# Patient Record
Sex: MALE | Race: WHITE | NOT HISPANIC OR LATINO | Employment: FULL TIME | URBAN - METROPOLITAN AREA
[De-identification: names, ages, dates, MRNs, and addresses within clinical notes are randomized per-mention and may not be internally consistent; named-entity substitution may affect disease eponyms.]

---

## 2023-03-07 ENCOUNTER — OFFICE VISIT (OUTPATIENT)
Dept: INTERNAL MEDICINE | Facility: CLINIC | Age: 48
End: 2023-03-07
Payer: COMMERCIAL

## 2023-03-07 VITALS
BODY MASS INDEX: 25.53 KG/M2 | DIASTOLIC BLOOD PRESSURE: 80 MMHG | OXYGEN SATURATION: 97 % | WEIGHT: 205.3 LBS | HEIGHT: 75 IN | HEART RATE: 66 BPM | SYSTOLIC BLOOD PRESSURE: 136 MMHG | TEMPERATURE: 97.9 F

## 2023-03-07 DIAGNOSIS — K40.90 RIGHT INGUINAL HERNIA: ICD-10-CM

## 2023-03-07 DIAGNOSIS — Z00.00 ROUTINE GENERAL MEDICAL EXAMINATION AT A HEALTH CARE FACILITY: Primary | ICD-10-CM

## 2023-03-07 DIAGNOSIS — Z12.5 SCREENING FOR PROSTATE CANCER: ICD-10-CM

## 2023-03-07 DIAGNOSIS — Z13.6 CARDIOVASCULAR SCREENING; LDL GOAL LESS THAN 130: ICD-10-CM

## 2023-03-07 DIAGNOSIS — Z13.220 SCREENING FOR HYPERLIPIDEMIA: ICD-10-CM

## 2023-03-07 DIAGNOSIS — Z01.818 PREOP GENERAL PHYSICAL EXAM: ICD-10-CM

## 2023-03-07 DIAGNOSIS — Z11.4 SCREENING FOR HIV (HUMAN IMMUNODEFICIENCY VIRUS): ICD-10-CM

## 2023-03-07 DIAGNOSIS — Z12.11 SCREEN FOR COLON CANCER: ICD-10-CM

## 2023-03-07 DIAGNOSIS — Z11.59 NEED FOR HEPATITIS C SCREENING TEST: ICD-10-CM

## 2023-03-07 PROCEDURE — 99386 PREV VISIT NEW AGE 40-64: CPT | Performed by: INTERNAL MEDICINE

## 2023-03-07 ASSESSMENT — ENCOUNTER SYMPTOMS
FEVER: 0
DIZZINESS: 0
EYE PAIN: 0
CONSTIPATION: 0
HEMATOCHEZIA: 0
DYSURIA: 0
HEADACHES: 0
DIARRHEA: 0
HEMATURIA: 0
SORE THROAT: 0
HEARTBURN: 0
PARESTHESIAS: 0
NAUSEA: 0
COUGH: 0
CHILLS: 0
ABDOMINAL PAIN: 0
JOINT SWELLING: 0
SHORTNESS OF BREATH: 0
WEAKNESS: 0
MYALGIAS: 0
NERVOUS/ANXIOUS: 0
FREQUENCY: 0
ARTHRALGIAS: 0
PALPITATIONS: 0

## 2023-03-07 ASSESSMENT — PAIN SCALES - GENERAL: PAINLEVEL: NO PAIN (0)

## 2023-03-07 NOTE — PATIENT INSTRUCTIONS
Referral to surgery clinic to evaluate the hernia and discuss repair options.     [facility]   [address]   [facility] [address]   Phone: [phone]   Fax: [phone]           PRE-OPERATIVE INSTRUCTIONS:     *  Contact your surgeon if there is any change in your health. This includes signs of new infection, such as cold or flu (such as a sore throat, runny nose, cough, rash or fever).  Elective surgeries may need to be postponed if there is significant illness present.    *        *      *  Stop aspirin of any kind for 7 days before procedure.   (even low dose daily aspirin).    *  No NSAIDs (Motrin, Advil, ibuprofen, Aleve, etc) within 5-7 days of surgery.    *  Stop any Fish Oil or multi vitamin (and specifically anything containing vitamin E) supplements for 7 days prior to surgery because these can affect platelet function.      *  Tylenol (acetaminophen) is OK to take if needed, this medication has no effect on platelet function.  Follow instructions on the bottle    *  Prepare your body as instructed by the surgery clinic.  If instructed, Take a shower or bath the night before surgery. Use any special soaps or cleaning instructions according to instructions from your surgeon.  If you do not have soap from your surgeon, use your regular soap. Do not shave or scrub the surgery site.  Wear clean pajamas and have clean sheets on your bed     *  ON THE MORNING OF SURGERY:     --take no medications.       *  Resume all medications at the same doses after surgery, unless instructed otherwise by the medical staff.     *  Attend all follow up appointments with the surgeons (and/or therapists if applicable) as instructed.     *  Contact the surgeon's office for any specific questions about after-surgery cares and follow up instructions.      *  You do not need to necessarily return to see us after your surgery unless instructed to do so.        IF YOU REQUIRE NARCOTIC PAIN  MEDICATION AFTER YOUR SURGERY:    --Take the narcotic pain medication exactly as prescribed, and use the absolute lowest dose needed for pain control.   The goal of pain medication is not complete pain relief, aim to just make it manageable.    --Beware of drowsiness, nausea, vomiting, when taking this medication.  Do not drive, or operate dangerous equipment after taking this.    --The main side effects from narcotic pain medication can also include intestinal side effects including nausea, vomiting, constipation, or diarrhea.    --If your pain is not able to be controlled with the pain medication supplied to you, contact the surgeons because uncontrolled pain can be a sign of possible surgical complications.    --In case of constipation from pain medications, take over the counter Miralax powder or stool softner Senokot.  If you have a history of constipation with narcotic pain medication, consider taking Miralax powder on any day that you take a pain tablet.                      ColoGuard Colon cancer screening:     *  The ColoGuard stool test is good noninvasive way to screen for colon cancer, in average risk individuals.      *  You should NOT use ColoGuard for colon cancer screening if you have a family history of colon cancer or if you have a history of pre-cancerous polyps, you should have a colonoscopy if you have either of these situations.      *  The ColoGuard collection kit will be mailed to your home address by the [employer].  Follow the instructions for collecting and returning the tablet.      *  I will inform you about the results.     *  If the ColoGuard test is Negative, then no colon cancer screening needed for 3 years (would consider either repeating the ColoGuard test or else doing a colonoscopy).     *  If the ColoGuard test is Positive, this does NOT mean that you have colon cacner, it simply means that you will need to have a colonoscopy.  Most of the time, pre-cancerous polyps can  "turn this test positive.        5 GOALS TO PREVENT VASCULAR DISEASE:     1.  Aggressive blood pressure control, under 130/80 ideally.  Using medications if needed.    Your blood pressure is under good control    BP Readings from Last 4 Encounters:   03/07/23 136/80   09/04/07 128/78   03/19/07 125/74       2.  Aggressive LDL cholesterol (\"bad cholesterol\") lowering as indicated.    Your goal is an LDL under 130 for sure, preferably under 100.  (If you have diabetes or previous vascular disease, the the LDL goals would be under 100 for sure, preferably under 70.)    New guidelines identify four high-risk groups who could benefit from statins:   *people with pre-existing heart disease, such as those who have had a heart attack;   *people ages 40 to 75 who have diabetes of any type  *patients ages 40 to 75 with at least a 7.5% risk of developing cardiovascular disease over the next decade, according to a formula described in the guidelines  *patients with the sort of super-high cholesterol that sometimes runs in families, as evidenced by an LDL of 190 milligrams per deciliter or higher        3.  Aggressive diabetic prevention, screening and/or management.      We are checking for diabetes.      4.  No smoking    5.  Consider daily preventative aspirin over age 50 if you have enough cardiac risk factors to place you at higher risk for the presence of vascular disease.    If you have any reason not to take aspirin such easy bruising or bleeding, stomach problems, other anticoagulant medications, or any other side effects, then you should not take Aspirin.     --Based on your current cardiac disease risk profile and/or age over 75, you do NOT need to take daily preventative aspirin.            Preventive Health Recommendations  Male Ages 40 to 49    Yearly exam:             See your health care provider every year in order to  o   Review health changes.   o   Discuss preventive care.    o   Review your medicines if your " "doctor has prescribed any.  You should be tested each year for STDs (sexually transmitted diseases) if you re at risk.   Have a cholesterol test every 5 years.   Have a colonoscopy (test for colon cancer) if someone in your family has had colon cancer or polyps before age 50.   After age 45, have a diabetes test (fasting glucose). If you are at risk for diabetes, you should have this test every 3 years.    Talk with your health care provider about whether or not a prostate cancer screening test (PSA) is right for you.    Shots: Get a flu shot each year. Get a tetanus shot every 10 years.     Nutrition:  Eat at least 5 servings of fruits and vegetables daily.   Eat whole-grain bread, whole-wheat pasta and brown rice instead of white grains and rice.   Get adequate Calcium and Vitamin D.      --Good Grains:  Oats, brown rice, Quinoa (these do not raise the blood sugar as much)     --Bad grains:  Anything made from wheat or white rice     (because these raise the blood sugars significantly, and the possible gluten issue from wheat for some people).      --Proteins:  Aim for \"lean proteins\" including chicken, fish, seafood, pork, turkey, and eggs (in moderation); Eat red meat only occasionally      Lifestyle  Exercise for at least 150 minutes a week (30 minutes a day, 5 days a week). This will help you control your weight and prevent disease.   Limit alcohol to one drink per day.   No smoking.   Wear sunscreen to prevent skin cancer.   See your dentist every six months for an exam and cleaning.      "

## 2023-03-07 NOTE — PROGRESS NOTES
"SUBJECTIVE:   CC: Allen is an 47 year old who presents for preventative health visit.     Patient has been advised of split billing requirements and indicates understanding: Yes  Healthy Habits:     Getting at least 3 servings of Calcium per day:  Yes    Bi-annual eye exam:  Yes    Dental care twice a year:  Yes    Sleep apnea or symptoms of sleep apnea:  None    Diet:  Regular (no restrictions)    Frequency of exercise:  6-7 days/week    Duration of exercise:  45-60 minutes    Taking medications regularly:  Not Applicable    Barriers to taking medications:  None    Medication side effects:  Not applicable and None    PHQ-2 Total Score: 0    Additional concerns today:  Yes      Answers for HPI/ROS submitted by the patient on 3/7/2023  Frequency of exercise:: 6-7 days/week  Getting at least 3 servings of Calcium per day:: Yes  Diet:: Regular (no restrictions)  Taking medications regularly:: Not Applicable  Medication side effects:: Not applicable, None  Bi-annual eye exam:: Yes  Dental care twice a year:: Yes  Sleep apnea or symptoms of sleep apnea:: None  Additional concerns today:: Yes  Duration of exercise:: 45-60 minutes          1.  Reports \"bulge\" in the right groin area for past few months.  Appeared suddenly.  Reduces when he poushes back in, then pops out again.   No pain, does not bother him.     Today's PHQ-2 Score:   PHQ-2 ( 1999 [employer]) 3/7/2023   Q1: Little interest or pleasure in doing things 0   Q2: Feeling down, depressed or hopeless 0   PHQ-2 Score 0   Q1: Little interest or pleasure in doing things Not at all   Q2: Feeling down, depressed or hopeless Not at all   PHQ-2 Score 0       Have you ever done Advance Care Planning? (For example, a Health Directive, POLST, or a discussion with a medical provider or your loved ones about your wishes): No, advance care planning information given to patient to review.  Patient declined advance care planning discussion at this time.    Social History "     Tobacco Use     Smoking status: Never     Smokeless tobacco: Never   Substance Use Topics     Alcohol use: Yes     Comment: twice a week     Alcohol Use 3/7/2023   Prescreen: >3 drinks/day or >7 drinks/week? No   No flowsheet data found.    Last PSA: No results found for: PSA    Reviewed orders with patient. Reviewed health maintenance and updated orders accordingly - Yes      Reviewed and updated as needed this visit by clinical staff   Tobacco  Allergies  Meds   Med Hx  Surg Hx  Fam Hx          Reviewed and updated as needed this visit by Provider   Tobacco     Med Hx  Surg Hx  Fam Hx           **I reviewed the information recorded in the patient's EPIC chart (including but not limited to medical history, surgical history, family history, problem list, medication list, and allergy list) and updated the information as indicated based on the patients reported information.         Review of Systems   Constitutional: Negative for chills and fever.   HENT: Negative for congestion, ear pain, hearing loss and sore throat.    Eyes: Negative for pain and visual disturbance.   Respiratory: Negative for cough and shortness of breath.    Cardiovascular: Negative for chest pain, palpitations and peripheral edema.   Gastrointestinal: Negative for abdominal pain, constipation, diarrhea, heartburn, hematochezia and nausea.   Genitourinary: Negative for dysuria, frequency, genital sores, hematuria, impotence, penile discharge and urgency.   Musculoskeletal: Negative for arthralgias, joint swelling and myalgias.   Skin: Negative for rash.   Neurological: Negative for dizziness, weakness, headaches and paresthesias.   Psychiatric/Behavioral: Negative for mood changes. The patient is not nervous/anxious.      CONSTITUTIONAL: NEGATIVE for fever, chills, change in weight  INTEGUMENTARY/SKIN: NEGATIVE for worrisome rashes, moles or lesions  EYES: NEGATIVE for vision changes or irritation  ENT: NEGATIVE for ear, mouth and  "throat problems  RESP: NEGATIVE for significant cough or SOB  CV: NEGATIVE for chest pain, palpitations or peripheral edema  GI: NEGATIVE for nausea, abdominal pain, heartburn, or change in bowel habits   male: negative for dysuria, hematuria, decreased urinary stream, erectile dysfunction, urethral discharge  MUSCULOSKELETAL: NEGATIVE for significant arthralgias or myalgia  NEURO: NEGATIVE for weakness, dizziness or paresthesias  PSYCHIATRIC: NEGATIVE for changes in mood or affect    OBJECTIVE:   /80   Pulse 66   Temp 97.9  F (36.6  C) (Oral)   Ht 1.905 m (6' 3\")   Wt 93.1 kg (205 lb 4.8 oz)   SpO2 97%   BMI 25.66 kg/m      Physical Exam    GENERAL alert and no distress  EYES:  Normal sclera,conjunctiva, EOMI  HENT: oral and posterior pharynx without lesions or erythema, facies symmetric  NECK: Neck supple. No LAD, without thyroidmegaly.  RESP: Clear to ausculation bilaterally without wheezes or crackles. Normal BS in all fields.  CV: RRR normal S1S2 without murmurs, rubs or gallops.  LYMPH: no cervical lymph adenopathy appreciated  MS: extremities- no gross deformities of the visible extremities noted,   EXT:  no lower extremity edema  PSYCH: Alert and oriented times 3; speech- coherent  SKIN:  No obvious significant skin lesions on visible portions of face   :  Right inguinal hernia, nontender, easily reducible.      Diagnostic Test Results:  Labs reviewed in Epic    ASSESSMENT/PLAN:     (Z00.00) Routine general medical examination at a health care facility  (primary encounter diagnosis)  Comment: Discussed cardiac disease risk factor modification including screening, preventing, and treating hypertension, elevated lipids, diabetes, and smoking cessation.    Discussed age appropriate cancer screening recommendations as dictated by age group and past medical history.    Recommended making better food choices as often as possible, including lower carb, lower fat, lower salt diet and moderation in " any alcohol intake.    Recommended maintaining regular physical activity/exercise throughout their lifetime.  Recommended safety and injury prevention (i.e. seatbelt use, safety equipment like helmets when biking, etc).       Plan: REVIEW OF HEALTH MAINTENANCE PROTOCOL ORDERS            (K40.90) Right inguinal hernia  Comment: Has apparent right inguinal hernia.   Discussed the pathophysiology of hernias and their mechanical nature.   No evidence for incarceration at this time.   Normal bowel function, little pain.   Will refer to surgery for further evaluation and management, told the patient they most likely will require surgery.    Discussed signs and symptoms of hernia incarceration and told the patient to immediately go to the ER if the pain becomes severe.    Plan: Adult General Surg Referral            (Z01.818) Preop general physical exam  Comment: will almost certainly be offered surgery to repair this hernia, no need to return for another appointment for a pre-op, I can clear him based on today's exam.   **see separate chart note for the specific pre-op clearance documentation**  If the surgery falls outside the 30 days window, somebody has to let me know and I can write an addendum within the 30 day window and clear him.  Plan:     (Z13.6) CARDIOVASCULAR SCREENING; LDL GOAL LESS THAN 130  Comment: Discussed cardiac disease risk factors and cardiac disease risk factor modification.   Plan: REVIEW OF HEALTH MAINTENANCE PROTOCOL ORDERS,         CBC with platelets, Basic metabolic panel,         Lipid panel reflex to direct LDL Fasting            (Z12.11) Screen for colon cancer  Comment: I discussed current recommendations for routine colon cancer screening starting at age 45 (age 35 for family history of colon cancer).  Recommending colonoscopy as gold standard test, but the patient is declining to do colonoscopy at this time.   Discussed ColoGuard stool test for routine colon cancer screening, and will  order it if covered by their insurance.   If result Negative, repeat ColoGuard testing every 3 years (or eventually consider colonoscopy)  If result Positive, does not necessarily mean colon cancer, but means they need colonoscopy.    Plan: REVIEW OF HEALTH MAINTENANCE PROTOCOL ORDERS,         COLOGUARD(EXACT SCIENCES)            (Z11.4) Screening for HIV (human immunodeficiency virus)  Comment: One time screening test per CDC recommendations.   Plan: REVIEW OF HEALTH MAINTENANCE PROTOCOL ORDERS,         HIV Antigen Antibody Combo            (Z11.59) Need for hepatitis C screening test  Comment: One time screening test per CDC recommendations.   Plan: REVIEW OF HEALTH MAINTENANCE PROTOCOL ORDERS,         Hepatitis C Screen Reflex to HCV RNA Quant and         Genotype            (Z13.220) Screening for hyperlipidemia  Comment:   Plan: REVIEW OF HEALTH MAINTENANCE PROTOCOL ORDERS,         Lipid panel reflex to direct LDL Fasting            (Z12.5) Screening for prostate cancer  Comment: Discussed prostate cancer screening and PSA blood test.  Discussed that an elevated PSA blood test can be caused by things other than prostate cancer, including infection/inflammation, BPH, and recent sexual activity; and that elevated PSA blood test may require further investigation with a urologist   Plan: REVIEW OF HEALTH MAINTENANCE PROTOCOL ORDERS,         PSA, screen               Patient has been advised of split billing requirements and indicates understanding: Yes      COUNSELING:   Reviewed preventive health counseling, as reflected in patient instructions       Regular exercise       Healthy diet/nutrition       Vision screening       Hearing screening       Immunizations    Recommended annual flu shot when available    Recommended updated covid booster             Colorectal cancer screening       Prostate cancer screening        He reports that he has never smoked. He has never used smokeless tobacco.            [place]  Tyrel Harry MD  [facility]

## 2023-03-07 NOTE — PROGRESS NOTES
[address] [address]  Phone: [phone]  Primary Provider: No primary care provider on file.  Pre-op Performing Provider: CAMPOS HARRY        ADDENDUM (4/18/2023):    *  There have been no changes to the patient's medical status since the original exam from 3/7/23.    *  Patient is still cleared for surgery and anesthesia and may proceed with the surgery and general anesthesia for the procedure as planned without further evaluation.           Campos Harry M.D.  [facility]         PREOPERATIVE EVALUATION:  Today's date: 3/7/2023    Allen Hill is a 47 year old male who presents for a preoperative evaluation.    Surgical Information:  Surgery/Procedure: right hernia repair  Surgery Location: [facility]   Surgeon: Enrico Mauricio MD  Surgery Date: 4/25/23  Time of Surgery: 750 AM  Where patient plans to recover: At home with family  Fax number for surgical facility: surgery to be performed at [facility], nothing to fax    Type of Anesthesia Anticipated: TBD, likely general anesthesia    1. Routine general medical examination at a health care facility    2. Right inguinal hernia    3. Preop general physical exam    4. CARDIOVASCULAR SCREENING; LDL GOAL LESS THAN 130    5. Screen for colon cancer    6. Screening for HIV (human immunodeficiency virus)    7. Need for hepatitis C screening test    8. Screening for hyperlipidemia    9. Screening for prostate cancer         Subjective     HPI related to upcoming procedure: right inguinal hernia          Preop Questions 3/7/2023   1. Have you ever had a heart attack or stroke? No   2. Have you ever had surgery on your heart or blood vessels, such as a stent placement, a coronary artery bypass, or surgery on an artery in your head, neck, heart, or legs? No   3. Do you have chest pain with activity? No   4. Do you have a history of  heart  failure? No   5. Do you currently have a cold, bronchitis or symptoms of other infection? No   6. Do you have a cough, shortness of breath, or wheezing? No   7. Do you or anyone in your family have previous history of blood clots? No   8. Do you or does anyone in your family have a serious bleeding problem such as prolonged bleeding following surgeries or cuts? No   9. Have you ever had problems with anemia or been told to take iron pills? No   10. Have you had any abnormal blood loss such as black, tarry or bloody stools? No   11. Have you ever had a blood transfusion? No   12. Are you willing to have a blood transfusion if it is medically needed before, during, or after your surgery?    13. Have you or any of your relatives ever had problems with anesthesia? No   14. Do you have sleep apnea, excessive snoring or daytime drowsiness? No   14a. Do you have a CPAP machine? No   15. Do you have any artifical heart valves or other implanted medical devices like a pacemaker, defibrillator, or continuous glucose monitor? No   16. Do you have artificial joints? No   17. Are you allergic to latex? No          Health Care Directive:  Patient does not have a Health Care Directive or Living Will:     Preoperative Review of :   reviewed - no record of controlled substances prescribed.        *  No recent infectious illnesses.    *  No recent cardiac or pulmonary issues or symptoms.    *  No problems performing vigorous physical activity, no changes in exercise tolerance.    *  No personal or family history of anesthesia complications.    *  No personal or family history of bleeding or clotting disorders.         Review of Systems  Constitutional, neuro, ENT, endocrine, pulmonary, cardiac, gastrointestinal, genitourinary, musculoskeletal, integument and psychiatric systems are negative, except as otherwise noted.    There are no problems to display for this patient.     Past Medical History:   Diagnosis Date     Other  "closed fractures of distal end of radius (alone) 01/01/1994    left     Past Surgical History:   Procedure Laterality Date     FRACTURE TX, WRIST RT/LT  1994    ORIF of left wrist fracture     Current Outpatient Medications   Medication Sig Dispense Refill     NO ACTIVE MEDICATIONS .       ibuprofen (ADVIL/MOTRIN) 600 MG tablet Take 600 mg by mouth every 6 hours as needed for moderate pain         Allergies   Allergen Reactions     Amoxicillin Hives        Social History     Tobacco Use     Smoking status: Never     Smokeless tobacco: Never   Vaping Use     Vaping status: Never Used   Substance Use Topics     Alcohol use: Yes     Comment: 2-4 drinks per week     Family History   Problem Relation Age of Onset     Breast Cancer Mother 45        recurrance     No Known Problems Father      Breast Cancer Sister 35        BRCA gene positive     C.A.D. Maternal Grandfather         age of onset = 50     Diabetes Maternal Grandfather      Colon Cancer No family hx of      History   Drug Use No         Objective     /80   Pulse 66   Temp 97.9  F (36.6  C) (Oral)   Ht 1.905 m (6' 3\")   Wt 93.1 kg (205 lb 4.8 oz)   SpO2 97%   BMI 25.66 kg/m      Physical Exam  GENERAL alert and no distress  EYES:  Normal sclera,conjunctiva, EOMI  HENT: oral and posterior pharynx without lesions or erythema, facies symmetric  NECK: Neck supple. No LAD, without thyroidmegaly.  RESP: Clear to ausculation bilaterally without wheezes or crackles. Normal BS in all fields.  CV: RRR normal S1S2 without murmurs, rubs or gallops.  LYMPH: no cervical lymph adenopathy appreciated  MS: extremities- no gross deformities of the visible extremities noted,   EXT:  no lower extremity edema  PSYCH: Alert and oriented times 3; speech- coherent  SKIN:  No obvious significant skin lesions on visible portions of face   :  Right inguinal hernia, nontender, easiyl reducible.     Labs will be performed prior to surgery, I will review them prior to " surgery.     Diagnostics:    Component      Latest Ref Rng 3/9/2023  2:19 PM   Sodium      136 - 145 mmol/L 141    Potassium      3.4 - 5.3 mmol/L 4.3    Chloride      98 - 107 mmol/L 104    Carbon Dioxide (CO2)      22 - 29 mmol/L 24    Anion Gap      7 - 15 mmol/L 13    Urea Nitrogen      6.0 - 20.0 mg/dL 13.2    Creatinine      0.67 - 1.17 mg/dL 0.92    Calcium      8.6 - 10.0 mg/dL 9.8    Glucose      70 - 99 mg/dL 95    GFR Estimate      >60 mL/min/1.73m2 >90    WBC      4.0 - 11.0 10e3/uL 8.5    RBC Count      4.40 - 5.90 10e6/uL 5.08    Hemoglobin      13.3 - 17.7 g/dL 15.6    Hematocrit      40.0 - 53.0 % 46.1    MCV      78 - 100 fL 91    MCH      26.5 - 33.0 pg 30.7    MCHC      31.5 - 36.5 g/dL 33.8    RDW      10.0 - 15.0 % 12.9    Platelet Count      150 - 450 10e3/uL 227            Labs pending at this time.  Results will be reviewed when available.     No EKG required for low risk surgery (cataract, skin procedure, breast biopsy, etc).  No EKG required, no history of coronary heart disease, significant arrhythmia, peripheral arterial disease or other structural heart disease.    Revised Cardiac Risk Index (RCRI):  The patient has the following serious cardiovascular risks for perioperative complications:   - No serious cardiac risks = 0 points     RCRI Interpretation: 0 points: Class I (very low risk - 0.4% complication rate)           Signed Electronically by:   Campos Harry MD  Copy of this evaluation report is provided to requesting physician.      Answers for HPI/ROS submitted by the patient on 3/7/2023  Frequency of exercise:: 6-7 days/week  Getting at least 3 servings of Calcium per day:: Yes  Diet:: Regular (no restrictions)  Taking medications regularly:: Not Applicable  Medication side effects:: Not applicable, None  Bi-annual eye exam:: Yes  Dental care twice a year:: Yes  Sleep apnea or symptoms of sleep apnea:: None  Additional concerns today:: Yes  Duration of exercise:: 45-60  minutes

## 2023-03-09 ENCOUNTER — LAB (OUTPATIENT)
Dept: LAB | Facility: CLINIC | Age: 48
End: 2023-03-09
Payer: COMMERCIAL

## 2023-03-09 DIAGNOSIS — Z13.220 SCREENING FOR HYPERLIPIDEMIA: ICD-10-CM

## 2023-03-09 DIAGNOSIS — Z11.4 SCREENING FOR HIV (HUMAN IMMUNODEFICIENCY VIRUS): ICD-10-CM

## 2023-03-09 DIAGNOSIS — Z12.5 SCREENING FOR PROSTATE CANCER: ICD-10-CM

## 2023-03-09 DIAGNOSIS — Z13.6 CARDIOVASCULAR SCREENING; LDL GOAL LESS THAN 130: ICD-10-CM

## 2023-03-09 DIAGNOSIS — Z11.59 NEED FOR HEPATITIS C SCREENING TEST: ICD-10-CM

## 2023-03-09 LAB
ANION GAP SERPL CALCULATED.3IONS-SCNC: 13 MMOL/L (ref 7–15)
BUN SERPL-MCNC: 13.2 MG/DL (ref 6–20)
CALCIUM SERPL-MCNC: 9.8 MG/DL (ref 8.6–10)
CHLORIDE SERPL-SCNC: 104 MMOL/L (ref 98–107)
CHOLEST SERPL-MCNC: 257 MG/DL
CREAT SERPL-MCNC: 0.92 MG/DL (ref 0.67–1.17)
DEPRECATED HCO3 PLAS-SCNC: 24 MMOL/L (ref 22–29)
ERYTHROCYTE [DISTWIDTH] IN BLOOD BY AUTOMATED COUNT: 12.9 % (ref 10–15)
GFR SERPL CREATININE-BSD FRML MDRD: >90 ML/MIN/1.73M2
GLUCOSE SERPL-MCNC: 95 MG/DL (ref 70–99)
HCT VFR BLD AUTO: 46.1 % (ref 40–53)
HDLC SERPL-MCNC: 60 MG/DL
HGB BLD-MCNC: 15.6 G/DL (ref 13.3–17.7)
LDLC SERPL CALC-MCNC: 183 MG/DL
MCH RBC QN AUTO: 30.7 PG (ref 26.5–33)
MCHC RBC AUTO-ENTMCNC: 33.8 G/DL (ref 31.5–36.5)
MCV RBC AUTO: 91 FL (ref 78–100)
NONHDLC SERPL-MCNC: 197 MG/DL
PLATELET # BLD AUTO: 227 10E3/UL (ref 150–450)
POTASSIUM SERPL-SCNC: 4.3 MMOL/L (ref 3.4–5.3)
PSA SERPL-MCNC: 0.85 NG/ML (ref 0–2.5)
RBC # BLD AUTO: 5.08 10E6/UL (ref 4.4–5.9)
SODIUM SERPL-SCNC: 141 MMOL/L (ref 136–145)
TRIGL SERPL-MCNC: 69 MG/DL
WBC # BLD AUTO: 8.5 10E3/UL (ref 4–11)

## 2023-03-09 PROCEDURE — 85027 COMPLETE CBC AUTOMATED: CPT

## 2023-03-09 PROCEDURE — G0103 PSA SCREENING: HCPCS

## 2023-03-09 PROCEDURE — 36415 COLL VENOUS BLD VENIPUNCTURE: CPT

## 2023-03-09 PROCEDURE — 80048 BASIC METABOLIC PNL TOTAL CA: CPT

## 2023-03-09 PROCEDURE — 87389 HIV-1 AG W/HIV-1&-2 AB AG IA: CPT

## 2023-03-09 PROCEDURE — 86803 HEPATITIS C AB TEST: CPT

## 2023-03-09 PROCEDURE — 80061 LIPID PANEL: CPT

## 2023-03-10 LAB
HCV AB SERPL QL IA: NONREACTIVE
HIV 1+2 AB+HIV1 P24 AG SERPL QL IA: NONREACTIVE

## 2023-03-15 ENCOUNTER — OFFICE VISIT (OUTPATIENT)
Dept: SURGERY | Facility: CLINIC | Age: 48
End: 2023-03-15
Payer: COMMERCIAL

## 2023-03-15 ENCOUNTER — TELEPHONE (OUTPATIENT)
Dept: SURGERY | Facility: CLINIC | Age: 48
End: 2023-03-15

## 2023-03-15 VITALS
HEIGHT: 75 IN | RESPIRATION RATE: 16 BRPM | HEART RATE: 52 BPM | WEIGHT: 205 LBS | OXYGEN SATURATION: 94 % | BODY MASS INDEX: 25.49 KG/M2 | SYSTOLIC BLOOD PRESSURE: 116 MMHG | DIASTOLIC BLOOD PRESSURE: 76 MMHG

## 2023-03-15 DIAGNOSIS — K40.90 INGUINAL HERNIA OF RIGHT SIDE WITHOUT OBSTRUCTION OR GANGRENE: Primary | ICD-10-CM

## 2023-03-15 PROCEDURE — 99204 OFFICE O/P NEW MOD 45 MIN: CPT | Performed by: STUDENT IN AN ORGANIZED HEALTH CARE EDUCATION/TRAINING PROGRAM

## 2023-03-15 RX ORDER — ACETAMINOPHEN 325 MG/1
975 TABLET ORAL ONCE
Status: CANCELLED | OUTPATIENT
Start: 2023-03-15 | End: 2023-03-15

## 2023-03-15 NOTE — LETTER
[facility]    [address], [address]  [place], [place] [address]  Phone [phone]  Fax [phone] [address], [address]  [place] [address]  Phone [phone]  Fax [phone]    [email or web address]   March 15, 2023       Allen Hill    RE: [id number]  : 1975    Allen Hill has been scheduled for surgery on 23 at 7:50 AM at [facility] with Dr Enrico Mauricio.  The hospital is located at [address] in [place].      Please check in at the Surgery reception desk at 5:50 AM. This is located in the back of the [facility] on the East side, just past the Emergency Room entrance.       DO NOT EAT OR DRINK ANYTHING 8 HOURS BEFORE YOUR ARRIVAL TIME.   You may have sips of clear liquids up until 2 hours before your arrival time. If you have been advised to take your medication, please do this early in the morning with just sips of clear liquid.       Hospital regulations require an updated pre-operative examination to be completed within 30 days of the procedure. This can be done by your primary care provider. Please ask them to fax documentation to [phone]. We also recommend you bring a copy with you.       You should shower before your surgery with Hibiclens or Exidine soap.  This can be found at your local pharmacy or you can pick it up from our office for free.  Please call our office if you have any questions.       You will be required to have an Adult (friend or family member) drive you home after your surgery and arrange for an adult to stay with you until the next morning.       You will receive several calls from our staff 3-7 days prior to your scheduled procedure with further details and to answer any questions you may have.      It is sometimes necessary to adjust the surgery schedule due to emergencies and additions to the schedule.  If your surgery is affected  by this, we greatly appreciate your flexibility and understanding in this matter      It is best if you call regarding post-operative questions between the hours of 8:00 am & 3:00 pm Monday-Friday, so you have access to the daytime care team that know you best.  ? Prescription refills are accepted during regular office hours only.      Please do not bring any Disability or FMLA papers to the hospital.  They need to be either faxed ([phone]), mailed or hand delivered to our office by you or a family member for completion.  ? Please allow 14 business days to complete paperwork.        If you have questions or concerns, please contact our office at [phone].

## 2023-03-15 NOTE — PROGRESS NOTES
[facility]  New Patient Office Visit    Assessment:   Allen Hill is a 47 year old male with Primary, reducible right inguinal hernia.    Plan:    We will schedule a robotic assisted right, possible left inguinal hernia repair at the patient's convenience.  Will need preop H&P by his PCP (Campos Harry MD) (already done)     We have discussed observation, reduction techniques and importance, incarceration and strangulation signs, symptoms and importance as well as need to seek emergency treatment.      We have had a detailed discussion regarding the nature of inguinal hernias, and that watchful waiting for small asymptomatic hernias is acceptable, but that in general they do tend to enlarge or become symptomatic over time. I have discussed surgery, indications, alternatives, risks, benefits, incisions, scarring, anesthesia, recovery, mesh, infection, bleeding, nerve damage and chronic pain, testicular loss, hernia recurrence, lifting and activity limitations after surgery.  All questions have been answered to the best of my ability.    Recommended time off work postop:  3-7 days  Recommended time off lifting 20 lb:   4 wks  He has been given literature to review.     He is seen in consultation for inguinal hernia, at the request of Campos Harry MD.        HPI:  Allen Hill is a 47 year old male who presents for evaluation of a right groin bulge. He has noticed the bulge for the last 3 months or so. It has not caused him significant pain or discomfort but he is being careful not to push too hard or lift too much. 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 He denies any issues eating or stooling. He denies any testicular pain.     Prior incarceration:  No   Nausea/vomitting/bloating:  No   Bulge/mass:  Yes    Previous herniorrhaphy:  No   Heavy lifting > 20 lb: No    Past Medical History:  Past Medical History:   Diagnosis Date     Other closed fractures  "of distal end of radius (alone) 1994    left       Current Outpatient Medications   Medication     NO ACTIVE MEDICATIONS     No current facility-administered medications for this visit.        Past Surgical History:  Past Surgical History:   Procedure Laterality Date     FRACTURE TX, WRIST RT/LT  1994    ORIF of left wrist fracture        Social History:  Social History     Tobacco Use     Smoking status: Never     Smokeless tobacco: Never   Vaping Use     Vaping Use: Never used   Substance Use Topics     Alcohol use: Yes     Comment: twice a week     Drug use: No        Family History:  Family History   Problem Relation Age of Onset     Breast Cancer Mother 45        recurrance     No Known Problems Father      Breast Cancer Sister 35        BRCA gene positive     C.A.D. Maternal Grandfather         age of onset = 50     Diabetes Maternal Grandfather      Colon Cancer No family hx of        PE:    Vitals: /76   Pulse 52   Resp 16   Ht 1.905 m (6' 3\")   Wt 93 kg (205 lb)   SpO2 94%   BMI 25.62 kg/m    BMI= Body mass index is 25.62 kg/m .  General- Well-developed, well-nourished, not in distress   Abdomen- abdomen is soft without significant tenderness, masses, organomegaly or guarding, no umbilical hernia  Hernia- Upon standing there is a subtle bulge in the right groin  Palpating with a finger at the external ring, a left inguinal hernia is not present with valsalva              Palpating with a finger at the external ring, a right inguinal hernia is present spontaneously              The hernia is manually reducible              Testes are descended bilaterally and normal        Enrico Mauricio MD  03/15/23 4:29 PM     Please route or send letter to:  Primary Care Provider (PCP)      "

## 2023-03-15 NOTE — TELEPHONE ENCOUNTER
Type of surgery: ROBOTIC ASSISTED RIGHT INGUINAL HERNIA REPAIR WITH MESH, POSSIBLE LEFT INGUINAL HERNIA REPAIR  Location of surgery: [facility]  Date and time of surgery: 4-25-23, 7:50 AM  Surgeon: DR OCAMPO   Pre-Op Appt Date: PATIENT TO SCHEDULE   Post-Op Appt Date: NA    Packet sent out: GIVEN TO PATIENT   Pre-cert/Authorization completed:  Not Applicable  Date: 3-15-23      ROBOTIC ASSISTED RIGHT INGUINAL HERNIA REPAIR WITH MESH, POSSIBLE LEFT INGUINAL HERNIA REPAIR WITH MESH   GENERAL   PT INST TO HAVE H&P   2 HRS REQ  PA ASSIST RTC  ALW

## 2023-03-15 NOTE — LETTER
[facility]    [address], [address]  [place], [place] [address]  Phone [phone]  Fax [phone] [address], [address]  [facility] [place] [address]  Phone [phone]  Fax [phone]    [email or web address]   Showering Before Surgery      Your surgeon has asked you to take 2 showers before surgery.    Why is this important?  It is normal for bacteria (germs) to be on your skin. The skin protects us from these germs. When you have surgery, we cut the skin. Sometimes germs get into the cuts and cause infection (illness caused by germs). By following the instructions below and using special soap, you will lower the number of germs on your skin. This decreases your chance of infection.  Special soap  Buy or get 8 ounces of antiseptic surgical soap called 4% CHG. Common name brands of this soap are Hibiclens and Exidine.  You can find it at your local pharmacy, clinic or retail store. If you have trouble, ask your pharmacist to help you find the right substitute.  A note about shaving:  Do not shave within 12 inches of your incision (surgical cut) area for at least 3 days before surgery. Shaving can make small cuts in the skin. This puts you at a higher risk of infection.  Items you will need for each shower:   1 newly washed towel   4 ounces of one of the above soaps   Clean pajamas or clothes to change into    Follow these instructions:  Follow these steps the evening before surgery and the morning of surgery.  1. Wash your hair and body with your regular shampoo and soap. Make sure you rinse the shampoo and soap from your hair and body.  2. Using clean hands, apply about 2 ounces of soap gently on your skin from your ear lobes to your toes. Use on your groin area last. Do not use this soap on your face or head. If you get any soap in your eyes, ears or mouth, rinse right away.  3. Repeat step 2. It is very important to let the  soap stay on your skin for at least 1 minute.    4. Rinse well and dry off using a clean towel.    If you feel any tingling, itching or other irritation, rinse right away. It is normal to feel some coolness on the skin after using the antiseptic soap. Your skin may feel a bit dry after the shower, but do not use any lotions, creams or moisturizers. Do not use hair spray or other products in your hair.  5.  Dress in freshly washed clothes or pajamas. Use fresh pillowcases and sheets on your bed.    Repeat these steps the morning of surgery.  If you have any questions about showering or an allergy to CHG soap, please call your surgery center.    For informational purposes only. Not to replace the advice of your health care provider. Copyright   2012 [employer]. All rights reserved. Clinically reviewed by [employer]. ECO 343172 - REV 12

## 2023-03-25 LAB — NONINV COLON CA DNA+OCC BLD SCRN STL QL: POSITIVE

## 2023-04-17 ENCOUNTER — MYC MEDICAL ADVICE (OUTPATIENT)
Dept: INTERNAL MEDICINE | Facility: CLINIC | Age: 48
End: 2023-04-17
Payer: COMMERCIAL

## 2023-04-17 DIAGNOSIS — Z12.11 SCREEN FOR COLON CANCER: Primary | ICD-10-CM

## 2023-04-18 RX ORDER — IBUPROFEN 600 MG/1
600 TABLET, FILM COATED ORAL EVERY 6 HOURS PRN
COMMUNITY

## 2023-04-18 NOTE — TELEPHONE ENCOUNTER
Pre-op note from 3/7/23 amended to clear him for his upcoming surgery.     Needs colonoscopy for the cologuard test result.   Referral placed and faxed to [facility] [phone] (fax [phone])

## 2023-04-22 ENCOUNTER — HEALTH MAINTENANCE LETTER (OUTPATIENT)
Age: 48
End: 2023-04-22

## 2023-04-25 ENCOUNTER — ANESTHESIA EVENT (OUTPATIENT)
Dept: SURGERY | Facility: CLINIC | Age: 48
End: 2023-04-25
Payer: COMMERCIAL

## 2023-04-25 ENCOUNTER — HOSPITAL ENCOUNTER (OUTPATIENT)
Facility: CLINIC | Age: 48
Discharge: HOME | End: 2023-04-25
Attending: STUDENT IN AN ORGANIZED HEALTH CARE EDUCATION/TRAINING PROGRAM | Admitting: STUDENT IN AN ORGANIZED HEALTH CARE EDUCATION/TRAINING PROGRAM
Payer: COMMERCIAL

## 2023-04-25 ENCOUNTER — APPOINTMENT (OUTPATIENT)
Dept: SURGERY | Facility: PHYSICIAN GROUP | Age: 48
End: 2023-04-25
Payer: COMMERCIAL

## 2023-04-25 ENCOUNTER — ANESTHESIA (OUTPATIENT)
Dept: SURGERY | Facility: CLINIC | Age: 48
End: 2023-04-25
Payer: COMMERCIAL

## 2023-04-25 VITALS
BODY MASS INDEX: 25.19 KG/M2 | RESPIRATION RATE: 16 BRPM | HEIGHT: 75 IN | HEART RATE: 63 BPM | SYSTOLIC BLOOD PRESSURE: 151 MMHG | DIASTOLIC BLOOD PRESSURE: 94 MMHG | TEMPERATURE: 97.1 F | OXYGEN SATURATION: 96 % | WEIGHT: 202.6 LBS

## 2023-04-25 DIAGNOSIS — K40.90 INGUINAL HERNIA OF RIGHT SIDE WITHOUT OBSTRUCTION OR GANGRENE: Primary | ICD-10-CM

## 2023-04-25 PROCEDURE — C1781 MESH (IMPLANTABLE): HCPCS | Performed by: STUDENT IN AN ORGANIZED HEALTH CARE EDUCATION/TRAINING PROGRAM

## 2023-04-25 PROCEDURE — 258N000003 HC RX IP 258 OP 636: Performed by: NURSE ANESTHETIST, CERTIFIED REGISTERED

## 2023-04-25 PROCEDURE — 370N000017 HC ANESTHESIA TECHNICAL FEE, PER MIN: Performed by: STUDENT IN AN ORGANIZED HEALTH CARE EDUCATION/TRAINING PROGRAM

## 2023-04-25 PROCEDURE — 250N000013 HC RX MED GY IP 250 OP 250 PS 637: Performed by: STUDENT IN AN ORGANIZED HEALTH CARE EDUCATION/TRAINING PROGRAM

## 2023-04-25 PROCEDURE — 250N000011 HC RX IP 250 OP 636: Performed by: NURSE ANESTHETIST, CERTIFIED REGISTERED

## 2023-04-25 PROCEDURE — 710N000009 HC RECOVERY PHASE 1, LEVEL 1, PER MIN: Performed by: STUDENT IN AN ORGANIZED HEALTH CARE EDUCATION/TRAINING PROGRAM

## 2023-04-25 PROCEDURE — 272N000001 HC OR GENERAL SUPPLY STERILE: Performed by: STUDENT IN AN ORGANIZED HEALTH CARE EDUCATION/TRAINING PROGRAM

## 2023-04-25 PROCEDURE — 250N000009 HC RX 250: Performed by: NURSE ANESTHETIST, CERTIFIED REGISTERED

## 2023-04-25 PROCEDURE — 250N000011 HC RX IP 250 OP 636: Performed by: STUDENT IN AN ORGANIZED HEALTH CARE EDUCATION/TRAINING PROGRAM

## 2023-04-25 PROCEDURE — 250N000009 HC RX 250: Performed by: STUDENT IN AN ORGANIZED HEALTH CARE EDUCATION/TRAINING PROGRAM

## 2023-04-25 PROCEDURE — 999N000141 HC STATISTIC PRE-PROCEDURE NURSING ASSESSMENT: Performed by: STUDENT IN AN ORGANIZED HEALTH CARE EDUCATION/TRAINING PROGRAM

## 2023-04-25 PROCEDURE — 250N000025 HC SEVOFLURANE, PER MIN: Performed by: STUDENT IN AN ORGANIZED HEALTH CARE EDUCATION/TRAINING PROGRAM

## 2023-04-25 PROCEDURE — 360N000080 HC SURGERY LEVEL 7, PER MIN: Performed by: STUDENT IN AN ORGANIZED HEALTH CARE EDUCATION/TRAINING PROGRAM

## 2023-04-25 PROCEDURE — 49650 LAP ING HERNIA REPAIR INIT: CPT | Mod: AS | Performed by: PHYSICIAN ASSISTANT

## 2023-04-25 PROCEDURE — 710N000012 HC RECOVERY PHASE 2, PER MINUTE: Performed by: STUDENT IN AN ORGANIZED HEALTH CARE EDUCATION/TRAINING PROGRAM

## 2023-04-25 PROCEDURE — 49650 LAP ING HERNIA REPAIR INIT: CPT | Mod: RT | Performed by: STUDENT IN AN ORGANIZED HEALTH CARE EDUCATION/TRAINING PROGRAM

## 2023-04-25 DEVICE — LAPAROSCOPIC SELF-FIXATING MESH POLYESTER WITH POLYLACTIC ACID GRIPS AND COLLAGEN FILM
Type: IMPLANTABLE DEVICE | Site: INGUINAL | Status: FUNCTIONAL
Brand: PROGRIP

## 2023-04-25 RX ORDER — OXYCODONE HYDROCHLORIDE 5 MG/1
5 TABLET ORAL
Status: DISCONTINUED | OUTPATIENT
Start: 2023-04-25 | End: 2023-04-25 | Stop reason: HOSPADM

## 2023-04-25 RX ORDER — SODIUM CHLORIDE, SODIUM LACTATE, POTASSIUM CHLORIDE, CALCIUM CHLORIDE 600; 310; 30; 20 MG/100ML; MG/100ML; MG/100ML; MG/100ML
INJECTION, SOLUTION INTRAVENOUS CONTINUOUS
Status: DISCONTINUED | OUTPATIENT
Start: 2023-04-25 | End: 2023-04-25 | Stop reason: HOSPADM

## 2023-04-25 RX ORDER — ACETAMINOPHEN 325 MG/1
975 TABLET ORAL ONCE
Status: DISCONTINUED | OUTPATIENT
Start: 2023-04-25 | End: 2023-04-25 | Stop reason: HOSPADM

## 2023-04-25 RX ORDER — LABETALOL HYDROCHLORIDE 5 MG/ML
10 INJECTION, SOLUTION INTRAVENOUS
Status: DISCONTINUED | OUTPATIENT
Start: 2023-04-25 | End: 2023-04-25 | Stop reason: HOSPADM

## 2023-04-25 RX ORDER — ACETAMINOPHEN 325 MG/1
650 TABLET ORAL EVERY 4 HOURS PRN
Qty: 50 TABLET | Refills: 0 | Status: SHIPPED | OUTPATIENT
Start: 2023-04-25

## 2023-04-25 RX ORDER — CLINDAMYCIN PHOSPHATE 900 MG/50ML
900 INJECTION, SOLUTION INTRAVENOUS
Status: COMPLETED | OUTPATIENT
Start: 2023-04-25 | End: 2023-04-25

## 2023-04-25 RX ORDER — DEXAMETHASONE SODIUM PHOSPHATE 4 MG/ML
INJECTION, SOLUTION INTRA-ARTICULAR; INTRALESIONAL; INTRAMUSCULAR; INTRAVENOUS; SOFT TISSUE PRN
Status: DISCONTINUED | OUTPATIENT
Start: 2023-04-25 | End: 2023-04-25

## 2023-04-25 RX ORDER — HYDROMORPHONE HCL IN WATER/PF 6 MG/30 ML
0.4 PATIENT CONTROLLED ANALGESIA SYRINGE INTRAVENOUS EVERY 5 MIN PRN
Status: DISCONTINUED | OUTPATIENT
Start: 2023-04-25 | End: 2023-04-25 | Stop reason: HOSPADM

## 2023-04-25 RX ORDER — ACETAMINOPHEN 325 MG/1
975 TABLET ORAL ONCE
Status: COMPLETED | OUTPATIENT
Start: 2023-04-25 | End: 2023-04-25

## 2023-04-25 RX ORDER — FENTANYL CITRATE 50 UG/ML
50 INJECTION, SOLUTION INTRAMUSCULAR; INTRAVENOUS EVERY 5 MIN PRN
Status: DISCONTINUED | OUTPATIENT
Start: 2023-04-25 | End: 2023-04-25 | Stop reason: HOSPADM

## 2023-04-25 RX ORDER — AMOXICILLIN 250 MG
1-2 CAPSULE ORAL 2 TIMES DAILY
Qty: 30 TABLET | Refills: 0 | Status: SHIPPED | OUTPATIENT
Start: 2023-04-25

## 2023-04-25 RX ORDER — HYDROMORPHONE HCL IN WATER/PF 6 MG/30 ML
0.2 PATIENT CONTROLLED ANALGESIA SYRINGE INTRAVENOUS EVERY 5 MIN PRN
Status: DISCONTINUED | OUTPATIENT
Start: 2023-04-25 | End: 2023-04-25 | Stop reason: HOSPADM

## 2023-04-25 RX ORDER — GLYCOPYRROLATE 0.2 MG/ML
INJECTION, SOLUTION INTRAMUSCULAR; INTRAVENOUS PRN
Status: DISCONTINUED | OUTPATIENT
Start: 2023-04-25 | End: 2023-04-25

## 2023-04-25 RX ORDER — ONDANSETRON 2 MG/ML
4 INJECTION INTRAMUSCULAR; INTRAVENOUS EVERY 30 MIN PRN
Status: DISCONTINUED | OUTPATIENT
Start: 2023-04-25 | End: 2023-04-25 | Stop reason: HOSPADM

## 2023-04-25 RX ORDER — LIDOCAINE HYDROCHLORIDE 20 MG/ML
INJECTION, SOLUTION INFILTRATION; PERINEURAL PRN
Status: DISCONTINUED | OUTPATIENT
Start: 2023-04-25 | End: 2023-04-25

## 2023-04-25 RX ORDER — FENTANYL CITRATE 50 UG/ML
INJECTION, SOLUTION INTRAMUSCULAR; INTRAVENOUS PRN
Status: DISCONTINUED | OUTPATIENT
Start: 2023-04-25 | End: 2023-04-25

## 2023-04-25 RX ORDER — OXYCODONE HYDROCHLORIDE 5 MG/1
5-10 TABLET ORAL EVERY 4 HOURS PRN
Qty: 6 TABLET | Refills: 0 | Status: SHIPPED | OUTPATIENT
Start: 2023-04-25

## 2023-04-25 RX ORDER — ACETAMINOPHEN 325 MG/1
650 TABLET ORAL
Status: DISCONTINUED | OUTPATIENT
Start: 2023-04-25 | End: 2023-04-25 | Stop reason: HOSPADM

## 2023-04-25 RX ORDER — OXYCODONE HYDROCHLORIDE 5 MG/1
10 TABLET ORAL
Status: DISCONTINUED | OUTPATIENT
Start: 2023-04-25 | End: 2023-04-25 | Stop reason: HOSPADM

## 2023-04-25 RX ORDER — CLINDAMYCIN PHOSPHATE 900 MG/50ML
900 INJECTION, SOLUTION INTRAVENOUS SEE ADMIN INSTRUCTIONS
Status: DISCONTINUED | OUTPATIENT
Start: 2023-04-25 | End: 2023-04-25 | Stop reason: HOSPADM

## 2023-04-25 RX ORDER — ONDANSETRON 4 MG/1
4 TABLET, ORALLY DISINTEGRATING ORAL EVERY 30 MIN PRN
Status: DISCONTINUED | OUTPATIENT
Start: 2023-04-25 | End: 2023-04-25 | Stop reason: HOSPADM

## 2023-04-25 RX ORDER — BUPIVACAINE HYDROCHLORIDE AND EPINEPHRINE 5; 5 MG/ML; UG/ML
INJECTION, SOLUTION PERINEURAL PRN
Status: DISCONTINUED | OUTPATIENT
Start: 2023-04-25 | End: 2023-04-25 | Stop reason: HOSPADM

## 2023-04-25 RX ORDER — FENTANYL CITRATE 50 UG/ML
25 INJECTION, SOLUTION INTRAMUSCULAR; INTRAVENOUS EVERY 5 MIN PRN
Status: DISCONTINUED | OUTPATIENT
Start: 2023-04-25 | End: 2023-04-25 | Stop reason: HOSPADM

## 2023-04-25 RX ORDER — LIDOCAINE 40 MG/G
CREAM TOPICAL
Status: DISCONTINUED | OUTPATIENT
Start: 2023-04-25 | End: 2023-04-25 | Stop reason: HOSPADM

## 2023-04-25 RX ORDER — SODIUM CHLORIDE, SODIUM LACTATE, POTASSIUM CHLORIDE, CALCIUM CHLORIDE 600; 310; 30; 20 MG/100ML; MG/100ML; MG/100ML; MG/100ML
INJECTION, SOLUTION INTRAVENOUS CONTINUOUS PRN
Status: DISCONTINUED | OUTPATIENT
Start: 2023-04-25 | End: 2023-04-25

## 2023-04-25 RX ORDER — NEOSTIGMINE METHYLSULFATE 1 MG/ML
VIAL (ML) INJECTION PRN
Status: DISCONTINUED | OUTPATIENT
Start: 2023-04-25 | End: 2023-04-25

## 2023-04-25 RX ORDER — IBUPROFEN 600 MG/1
600 TABLET, FILM COATED ORAL EVERY 6 HOURS PRN
Qty: 30 TABLET | Refills: 0 | Status: SHIPPED | OUTPATIENT
Start: 2023-04-25

## 2023-04-25 RX ORDER — ONDANSETRON 2 MG/ML
INJECTION INTRAMUSCULAR; INTRAVENOUS PRN
Status: DISCONTINUED | OUTPATIENT
Start: 2023-04-25 | End: 2023-04-25

## 2023-04-25 RX ORDER — PROPOFOL 10 MG/ML
INJECTION, EMULSION INTRAVENOUS PRN
Status: DISCONTINUED | OUTPATIENT
Start: 2023-04-25 | End: 2023-04-25

## 2023-04-25 RX ADMIN — SODIUM CHLORIDE, POTASSIUM CHLORIDE, SODIUM LACTATE AND CALCIUM CHLORIDE: 600; 310; 30; 20 INJECTION, SOLUTION INTRAVENOUS at 07:29

## 2023-04-25 RX ADMIN — GLYCOPYRROLATE 0.8 MG: 0.2 INJECTION, SOLUTION INTRAMUSCULAR; INTRAVENOUS at 09:04

## 2023-04-25 RX ADMIN — ACETAMINOPHEN 975 MG: 325 TABLET ORAL at 06:44

## 2023-04-25 RX ADMIN — PROPOFOL 200 MG: 10 INJECTION, EMULSION INTRAVENOUS at 07:50

## 2023-04-25 RX ADMIN — GLYCOPYRROLATE 0.2 MG: 0.2 INJECTION, SOLUTION INTRAMUSCULAR; INTRAVENOUS at 07:50

## 2023-04-25 RX ADMIN — ONDANSETRON 4 MG: 2 INJECTION INTRAMUSCULAR; INTRAVENOUS at 09:01

## 2023-04-25 RX ADMIN — SODIUM CHLORIDE, POTASSIUM CHLORIDE, SODIUM LACTATE AND CALCIUM CHLORIDE: 600; 310; 30; 20 INJECTION, SOLUTION INTRAVENOUS at 08:35

## 2023-04-25 RX ADMIN — DEXAMETHASONE SODIUM PHOSPHATE 8 MG: 4 INJECTION, SOLUTION INTRA-ARTICULAR; INTRALESIONAL; INTRAMUSCULAR; INTRAVENOUS; SOFT TISSUE at 07:50

## 2023-04-25 RX ADMIN — MIDAZOLAM 2 MG: 1 INJECTION INTRAMUSCULAR; INTRAVENOUS at 07:48

## 2023-04-25 RX ADMIN — CLINDAMYCIN IN 5 PERCENT DEXTROSE 900 MG: 18 INJECTION, SOLUTION INTRAVENOUS at 07:21

## 2023-04-25 RX ADMIN — LIDOCAINE HYDROCHLORIDE 50 MG: 20 INJECTION, SOLUTION INFILTRATION; PERINEURAL at 07:50

## 2023-04-25 RX ADMIN — FENTANYL CITRATE 100 MCG: 50 INJECTION, SOLUTION INTRAMUSCULAR; INTRAVENOUS at 07:48

## 2023-04-25 RX ADMIN — NEOSTIGMINE METHYLSULFATE 4.5 MG: 1 INJECTION, SOLUTION INTRAVENOUS at 09:04

## 2023-04-25 RX ADMIN — ROCURONIUM BROMIDE 60 MG: 50 INJECTION, SOLUTION INTRAVENOUS at 07:50

## 2023-04-25 RX ADMIN — HYDROMORPHONE HYDROCHLORIDE 0.5 MG: 1 INJECTION, SOLUTION INTRAMUSCULAR; INTRAVENOUS; SUBCUTANEOUS at 08:06

## 2023-04-25 ASSESSMENT — ACTIVITIES OF DAILY LIVING (ADL)
ADLS_ACUITY_SCORE: 36

## 2023-04-25 NOTE — BRIEF OP NOTE
"[facility]    Brief Operative Note    Pre-operative diagnosis: Inguinal hernia of right side without obstruction or gangrene [K40.90]  Post-operative diagnosis Same as pre-operative diagnosis    Procedure: Procedure(s):  Xi ROBOTIC ASSISTED RIGHT POSSIBLE LEFT INGUINAL HERNIORRHAPHY WITH MESH  Surgeon: Surgeon(s) and Role:     * Enrico Mauricio MD - Primary     * Wyatt Mcfadden PA-C - Assisting  Anesthesia: General   Estimated Blood Loss: Minimal    Drains: None  Specimens: * No specimens in log *  Findings:   right inguinal hernia, no left inguinal hernia.  Complications: None.  Implants:   Implant Name Type Inv. Item Serial No.  Lot No. LRB No. Used Action   MESH PROGRIP LAPAROSCOPIC 5.9X3.9\" PARIETEX SELF-FIX [id number] - [id number] Mesh MESH PROGRIP LAPAROSCOPIC 5.9X3.9\" PARIETEX SELF-FIX [id number]  [facility] [id number] Right 1 Implanted           "

## 2023-04-25 NOTE — ANESTHESIA POSTPROCEDURE EVALUATION
Patient: Allen Hill    Procedure: Procedure(s):  Xi ROBOTIC ASSISTED RIGHT INGUINAL HERNIORRHAPHY WITH MESH       Anesthesia Type:  General    Note:  Disposition: Outpatient   Postop Pain Control: Uneventful            Sign Out: Well controlled pain   PONV: No   Neuro/Psych: Uneventful            Sign Out: Acceptable/Baseline neuro status   Airway/Respiratory: Uneventful            Sign Out: Acceptable/Baseline resp. status   CV/Hemodynamics: Uneventful            Sign Out: Acceptable CV status; No obvious hypovolemia; No obvious fluid overload   Other NRE: NONE   DID A NON-ROUTINE EVENT OCCUR? No           Last vitals:  Vitals Value Taken Time   /85 04/25/23 0945   Temp 98.1  F (36.7  C) 04/25/23 0919   Pulse 60 04/25/23 0954   Resp 18 04/25/23 0954   SpO2 94 % 04/25/23 0954       Electronically Signed By: Neeraj Drew MD  April 25, 2023  9:55 AM

## 2023-04-25 NOTE — OP NOTE
[facility] General Surgery Operative Note    Pre-operative diagnosis: Right inguinal hernia without obstruction or gangrene     Post-operative diagnosis: Same     Procedure: Robotic assisted right inguinal hernia repair    Surgeon: Enrico Mauricio MD   Assistant(s): Wyatt Mcfadden PA-C  The Physician Assistant was medically necessary for their expertise in prepping, camera management, suctioning, suturing and retraction.   Anesthesia: general   Estimated blood loss:  Specimen: 5 cc  None            INDICATION FOR OPERATION: This is a 47 year old male who presented to clinic with a right inguinal hernia. We discussed options for repair in clinic and mutually agreed upon a robotic inguinal hernia repair. We discussed the risks and benefits in clinic and again in the pre-op area and the patient agreed to proceed.     DESCRIPTION OF PROCEDURE:    Allen Hill was seen in the preinduction area.  The risk and benefits of the procedure were explained to the patient with the risk including bleeding, infection, injury to adjacent structures or organs, chronic pain, recurrence, and general risks of surgery including DVT, PE.  He understood and gave informed consent to proceed.  The patient was then brought to the operating room and placed on the operating room table in the supine position.  General anesthesia was administered.  The abdomen was then prepped and draped in the usual standard fashion.  A timeout was held.    A 8 mm skin incision was made in the left upper quadrant approximately 2 fingerbreadths below the costal margin.  Access into the abdomen was then obtained under direct visualization using the 5 mm 0 degree laparoscopic camera and the Optiview trocar.  A pneumoperitoneum was established and the abdomen was surveyed for any injury from our entry and none was seen.  A 8 mm robotic port was placed in the epigastrium just off the midline and another 8 mm robotic port was placed in the right upper  quadrant. The 5 mm trocar in the left upper quadrant was then replaced under direct visualization with an 8 mm robotic trocar.  The pelvis was examined and we noticed a right inguinal hernia. There was not a left inguinal hernia. The robot was then docked and I then unscrubbed and went to work at the robotic console.    We started our dissection on the right side and began by making a peritoneal flap several centimeters cephalad to the pelvic floor.  Using a combination of blunt and sharp dissection we developed our peritoneal flap thus dissecting out the preperitoneal space of Retzius taking care to avoid injury to the inferior epigastric vessels. The Blane's ligament was identified medially and our dissection carried past the midline and 2 cm below the pubic tubercle taking care to avoid injury to the bladder. We then carried our dissection out laterally to the ASIS. We were able to identify a moderate sized indirect hernia sac. There was no femoral hernia. The indirect sac was then lifted anteriorly and retracted cephalad and the hernia sac was dissected free from the cord structures and reduced with a combination of sharp and blunt dissection. A large cord lipoma was identified, reduced, removed from the abdomen and discarded. The cord structures were parietalized. We took care during our dissection to avoid injury to the cord structures. Hemostasis was excellent. The cord structures were then re-examined and were without any evidence of injury. One piece of the 10x15 cm Progrip mesh was then inserted into the preperitoneal space on the right side and positioned appropriately to ensure excellent overlap of our defects. The peritoneal flap was then closed with a running 3-0 V-lock suture. The laparoscopic suction tip was inserted into the preperitoneal space to evacuate CO2 from the preperitoneal space. The mesh was then examined and no folding or wrinkling of the mesh was seen. It was in excellent position. No  tears in the peritoneum were seen. The V-lock suture was then locked by running it back several throws. The robot was then undocked. The robotic ports were removed and the pneumoperitoneum evacuated. The skin incisions were then closed with 4-0 Vicryl intracuticular suture.     The patient was then woken, extubated, transferred to a transport gurney, and brought to the Postanesthesia Care Unit in satisfactory condition.  Sponge and needle counts were correct on two occasions prior to the completion of the case. Two testicles were palpated in the scrotum at the conclusion of the case.    FINDINGS:   1.) Moderate sized right indirect inguinal hernia with a large cord lipoma   2.) No evidence of a left inguinal hernia      Enrico Mauricio MD

## 2023-04-25 NOTE — ANESTHESIA PROCEDURE NOTES
Airway       Patient location during procedure: OR       Procedure Start/Stop Times: 4/25/2023 7:55 AM  Staff -        CRNA: Sandy Mejia APRN CRNA       Performed By: CRNA  Consent for Airway        Urgency: elective  Indications and Patient Condition       Indications for airway management: robert-procedural       Induction type:intravenous       Mask difficulty assessment: 1 - vent by mask    Final Airway Details       Final airway type: endotracheal airway       Successful airway: ETT - single and Oral  Endotracheal Airway Details        ETT size (mm): 8.0       Cuffed: yes       Successful intubation technique: direct laryngoscopy       DL Blade Type: Hurtado 2       Grade View of Cords: 1       Adjucts: stylet       Position: Right       Measured from: gums/teeth       Secured at (cm): 26       Bite block used: None    Post intubation assessment        Placement verified by: capnometry, equal breath sounds and chest rise        Number of attempts at approach: 1       Number of other approaches attempted: 0       Secured with: plastic tape       Ease of procedure: easy       Dentition: Unchanged    Medication(s) Administered   Medication Administration Time: 4/25/2023 7:55 AM

## 2023-04-25 NOTE — ANESTHESIA PREPROCEDURE EVALUATION
Anesthesia Pre-Procedure Evaluation    Patient: Allen Hill   MRN: [id number] : 1975        Procedure : Procedure(s):  Xi Robotic Assisted RIGHT POSSIBLE LEFT INGUINAL HERNIORRHAPHY WITH MESH          Past Medical History:   Diagnosis Date     Other closed fractures of distal end of radius (alone) 1994    left      Past Surgical History:   Procedure Laterality Date     FRACTURE TX, WRIST RT/LT      ORIF of left wrist fracture      Allergies   Allergen Reactions     Amoxicillin Hives      Social History     Tobacco Use     Smoking status: Never     Smokeless tobacco: Never   Vaping Use     Vaping status: Never Used   Substance Use Topics     Alcohol use: Yes     Comment: 2-4 drinks per week      Wt Readings from Last 1 Encounters:   23 91.9 kg (202 lb 9.6 oz)        Anesthesia Evaluation   Pt has had prior anesthetic. Type: General.    No history of anesthetic complications       ROS/MED HX  ENT/Pulmonary:  - neg pulmonary ROS     Neurologic:  - neg neurologic ROS     Cardiovascular:  - neg cardiovascular ROS     METS/Exercise Tolerance:     Hematologic:  - neg hematologic  ROS     Musculoskeletal:  - neg musculoskeletal ROS     GI/Hepatic:  - neg GI/hepatic ROS     Renal/Genitourinary:  - neg Renal ROS     Endo:  - neg endo ROS     Psychiatric/Substance Use:  - neg psychiatric ROS     Infectious Disease:  - neg infectious disease ROS     Malignancy:       Other:            Physical Exam    Airway        Mallampati: II   TM distance: > 3 FB   Neck ROM: full   Mouth opening: > 3 cm    Respiratory Devices and Support         Dental       (+) Minor Abnormalities - some fillings, tiny chips      Cardiovascular   cardiovascular exam normal          Pulmonary   pulmonary exam normal                OUTSIDE LABS:  CBC:   Lab Results   Component Value Date    WBC 8.5 2023    HGB 15.6 2023    HCT 46.1 2023     2023     BMP:   Lab Results   Component Value Date    NA  141 03/09/2023    POTASSIUM 4.3 03/09/2023    CHLORIDE 104 03/09/2023    CO2 24 03/09/2023    BUN 13.2 03/09/2023    CR 0.92 03/09/2023    GLC 95 03/09/2023     COAGS: No results found for: PTT, INR, FIBR  POC: No results found for: BGM, HCG, HCGS  HEPATIC: No results found for: ALBUMIN, PROTTOTAL, ALT, AST, GGT, ALKPHOS, BILITOTAL, BILIDIRECT, RYAN  OTHER:   Lab Results   Component Value Date    SHANTHI 9.8 03/09/2023       Anesthesia Plan    ASA Status:  1      Anesthesia Type: General.     - Airway: ETT   Induction: Intravenous.   Maintenance: Balanced.        Consents    Anesthesia Plan(s) and associated risks, benefits, and realistic alternatives discussed. Questions answered and patient/representative(s) expressed understanding.    - Discussed:     - Discussed with:  Patient      - Extended Intubation/Ventilatory Support Discussed: No.      - Patient is DNR/DNI Status: No    Use of blood products discussed: No .     Postoperative Care    Pain management: IV analgesics, Oral pain medications, Multi-modal analgesia.   PONV prophylaxis: Ondansetron (or other 5HT-3), Dexamethasone or Solumedrol     Comments:                Neeraj Drew MD

## 2023-04-25 NOTE — ANESTHESIA CARE TRANSFER NOTE
Patient: Allen Hill    Procedure: Procedure(s):  Xi ROBOTIC ASSISTED RIGHT POSSIBLE LEFT INGUINAL HERNIORRHAPHY WITH MESH       Diagnosis: Inguinal hernia of right side without obstruction or gangrene [K40.90]  Diagnosis Additional Information: No value filed.    Anesthesia Type:   General     Note:    Oropharynx: oral airway in place  Level of Consciousness: drowsy  Oxygen Supplementation: face mask  Level of Supplemental Oxygen (L/min / FiO2): 6  Independent Airway: airway patency satisfactory and stable  Dentition: dentition unchanged  Vital Signs Stable: post-procedure vital signs reviewed and stable  Report to RN Given: handoff report given  Patient transferred to: PACU    Handoff Report: Identifed the Patient, Identified the Reponsible Provider, Reviewed the pertinent medical history, Discussed the surgical course, Reviewed Intra-OP anesthesia mangement and issues during anesthesia, Set expectations for post-procedure period and Allowed opportunity for questions and acknowledgement of understanding      Vitals:  Vitals Value Taken Time   BP     Temp     Pulse 80 04/25/23 0918   Resp 12 04/25/23 0918   SpO2 98 % 04/25/23 0918   Vitals shown include unvalidated device data.    Electronically Signed By: MADHU Pereira CRNA  April 25, 2023  9:18 AM

## 2023-04-25 NOTE — DISCHARGE INSTRUCTIONS
HOME CARE FOLLOWING INGUINAL/FEMORAL HERNIA REPAIR  TAMERA George, KENNEDY Stone, TAM Burrell    DIET:  Start with liquids and gradually resume your regular diet as tolerated.  Drink plenty of fluids.  While taking pain medications, consider use of a stool softener, increase your fiber in your diet, or add a fiber supplement (like Metamucil, Citrucel) to help prevent constipation - a possible side effect of pain medications.    NAUSEA:  If nauseated from the anesthetic/pain meds; rest in bed, get up cautiously with assistance, and drink clear liquids (juice, tea, broth).    ACTIVITY:  Light Activity -- you may immediately be up and about as tolerated.  Walking is encouraged, increase as tolerated.  Driving/Light Work-- when comfortable and off narcotic pain medications.  Strenuous Work/Activity -- limit lifting to 20 pounds for 3 weeks.  Active Sports (running, biking, etc.) -- cautiously resume after 4 weeks.    INCISIONAL CARE:  If you have a dressing in place, keep clean and dry for 48 hours; you may replace the gauze if it becomes soiled.  After 48 hours you may remove the dressing and shower.  Do not submerse incision in water for 1 week.  If you have a Dermabond dressing (a type of skin glue), you may shower immediately.  Sutures will absorb and need not be removed.  If present, leave the steri-strips (white paper tapes) in place for 14 days after surgery.  If present, leave Dermabond glue in place until it wears/flakes off.  Do not apply lotions, creams, or ointments to incisions.  Expect a variable amount of swelling/black and blue discoloration that may involve the penis/scrotum or labia.  Some numbness around the incision is common.  A lump/ridge under the incision is normal and will gradually resolve.    DISCOMFORT:  Local anesthetic placed at surgery should provide relief for 4-8 hours.  Begin taking pain pills before discomfort is severe.  Take the pain medication with  some food, when possible, to minimize side effects.  Intermittent use of ice packs to the hernia repair site may help during the first 1-3 weeks after surgery.  Expect gradual improvement.    Over-the-counter anti-inflammatory medications (i.e. Ibuprofen/Advil/Motrin or Naprosyn/Aleve) may be used per package instructions in addition to or while tapering off the narcotic pain medications to decrease swelling and sensitivity at the repair site.  DO NOT TAKE these Anti-inflammatory medications if your primary physician has advised against doing so, or if you have acid reflux, ulcer, or bleeding disorder, or take blood-thinner medications.  Call your primary physician or the surgery office if you have medication questions.    FOLLOW-UP AFTER SURGERY:  -Our office will contact you approximately 2-3 weeks after surgery to check on your progress and answer any questions you may have.  If you are doing well, you will not need to return for an office appointment.  If any concerns are identified over the phone, we will help you make an appointment to see a provider.    -If you have not received a phone call, have any questions or concerns, or would like to be seen, please call us at [phone].  We are located at: [address], [address]; [place] [address]    -CONTACT US IF THE FOLLOWING DEVELOPS:   1. A fever that is above 101     2. Increased redness, warmth, drainage, bleeding, or swelling.   3. Pain that is not relieved by rest/ice and your prescription.   4.  Increasing pain after 48 hours.   5. Drainage that is thick, cloudy, yellow, green or white.   6. Any other questions or concerns.      FREQUENTLY ASKED QUESTIONS:    Q:  How should my incision look?    A:  Normally your incision will appear slightly swollen with light redness directly along the incision itself as it heals.  It may feel like a bump or ridge as the healing/scarring happens, and over time (3-4 months) this bump or ridge feeling should  slowly go away.  In general, clear or pink watery drainage can be normal at first as your incision heals, but should decrease over time.    Q:  How do I know if my incision is infected?  A:  Look at your incision for signs of infection, like redness around the incision spreading to surrounding skin, or drainage of cloudy or foul-smelling drainage.  If you feel warm, check your temperature to see if you are running a fever.    **If any of these things occur, please notify the nurse at our office.  We may need you to come into the office for an incision check.      Q:  How do I take care of my incision?  A:  If you have a dressing in place - Starting the day after surgery, replace the dressing 1-2 times a day until there is no further drainage from the incision.  At that time, a dressing is no longer needed.  Try to minimize tape on the skin if irritation is occurring at the tape sites.  If you have significant irritation from tape on the skin, please call the office to discuss other method of dressing your incision.    Small pieces of tape called  steri-strips  may be present directly overlying your incision; these may be removed 10 days after surgery unless otherwise specified by your surgeon.  If these tapes start to loosen at the ends, you may trim them back until they fall off or are removed.    A:  If you had  Dermabond  tissue glue used as a dressing (this causes your incision to look shiny with a clear covering over it) - This type of dressing wears off with time and does not require more dressings over the top unless it is draining around the glue as it wears off.  Do not apply ointments or lotions over the incisions until the glue has completely worn off.    Q:  There is a piece of tape or a sticky  lead  still on my skin.  Can I remove this?  A:  Sometimes the sticky  leads  used for monitoring during surgery or for evaluation in the emergency department are not all removed while you are in the hospital.   These sometimes have a tab or metal dot on them.  You can easily remove these on your own, like taking off a band-aid.  If there is a gel substance under the  lead , simply wipe/clean it off with a washcloth or paper towel.      Q:  What can I do to minimize constipation (very hard stools, or lack of stools)?  A:  Stay well hydrated.  Increase your dietary fiber intake or take a fiber supplement -with plenty of water.  Walk around frequently.  You may consider an over-the-counter stool-softener.  Your Pharmacist can assist you with choosing one that is stocked at your pharmacy.  Constipation is also one of the most common side effects of pain medication.  If you are using pain medication, be pro-active and try to PREVENT problems with constipation by taking the steps above BEFORE constipation becomes a problem.    Q:  What do I do if I need more pain medications?  A:  Call the office to receive refills.  Be aware that certain pain meds cannot be called into a pharmacy and actually require a paper prescription.  A change may be made in your pain med as you progress thru your recovery period or if you have side effects to certain meds.    --Pain meds are NOT refilled after 5pm on weekdays, and NOT AT ALL on the weekends, so please look ahead to prevent problems.    Q:  Why am I having a hard time sleeping now that I am at home?  A:  Many medications you receive while you are in the hospital can impact your sleep for a number of days after your surgery/hospitalization.  Decreased level of activity and naps during the day may also make sleeping at night difficult.  Try to minimize day-time naps, and get up frequently during the day to walk around your home during your recovery time.  Sleep aides may be of some help, but are not recommended for long-term use.      Q:  I am having some back discomfort.  What should I do?  A:  This may be related to certain positioning that was required for your surgery, extended periods  of time in bed, or other changes in your overall activity level.  You may try ice, heat, acetaminophen, or ibuprofen to treat this temporarily.  Note that many pain medications have acetaminophen in them and would state this on the prescription bottle.  Be sure not to exceed the maximum of 4000mg per day of acetaminophen.     **If the pain you are having does not resolve, is severe, or is a flare of back pain you have had on other occasions prior to surgery, please contact your primary physician for further recommendations or for an appointment to be examined at their office.    Q:  Why am I having headaches?  A:  Headaches can be caused by many things:  caffeine withdrawal, use of pain meds, dehydration, high blood pressure, lack of sleep, over-activity/exhaustion, flare-up of usual migraine headaches.  If you feel this is related to muscle tension (a band-like feeling around the head, or a pressure at the low-back of the head) you may try ice or heat to this area.  You may need to drink more fluids (try electrolyte drink like Gatorade), rest, or take your usual migraine medications.   **If your headaches do not resolve, worsen, are accompanied by other symptoms, or if your blood pressure is high, please call your primary physician for recommendation and/or examination.    Q:  I am unable to urinate.  What do I do?  A:  A small percentage of people can have difficulty urinating initially after surgery.  This includes being able to urinate only a very small amount at a time and feeling discomfort or pressure in the very low abdomen.  This is called  urinary retention , and is actually an urgent situation.  Proceed to your nearest Emergency department for evaluation (not an Urgent Care Center).  Sometimes the bladder does not work correctly after certain medications you receive during surgery, or related to certain procedures.  You may need to have a catheter placed until your bladder recovers.  When planning to go  to an Emergency department, it may help to call the ER to let them know you are coming in for this problem after a surgery.  This may help you get in quicker to be evaluated.  **If you have symptoms of a urinary tract infection, please contact your primary physician for the proper evaluation and treatment.        If you have other questions, please call the office Monday thru Friday between 8am and 4:30pm to discuss with the nurse or physician assistant.  #[phone]    There is a surgeon ON CALL on weekday evenings and over the weekend in case of urgent need only, and may be contacted at the same number.    If you are having an emergency, call 911 or proceed to your nearest emergency department.    GENERAL ANESTHESIA OR SEDATION ADULT DISCHARGE INSTRUCTIONS   SPECIAL PRECAUTIONS FOR 24 HOURS AFTER SURGERY    IT IS NOT UNUSUAL TO FEEL LIGHT-HEADED OR FAINT, UP TO 24 HOURS AFTER SURGERY OR WHILE TAKING PAIN MEDICATION.  IF YOU HAVE THESE SYMPTOMS; SIT FOR A FEW MINUTES BEFORE STANDING AND HAVE SOMEONE ASSIST YOU WHEN YOU GET UP TO WALK OR USE THE BATHROOM.    YOU SHOULD REST AND RELAX FOR THE NEXT 24 HOURS AND YOU MUST MAKE ARRANGEMENTS TO HAVE SOMEONE STAY WITH YOU FOR AT LEAST 24 HOURS AFTER YOUR DISCHARGE.  AVOID HAZARDOUS AND STRENUOUS ACTIVITIES.  DO NOT MAKE IMPORTANT DECISIONS FOR 24 HOURS.    DO NOT DRIVE ANY VEHICLE OR OPERATE MECHANICAL EQUIPMENT FOR 24 HOURS FOLLOWING THE END OF YOUR SURGERY.  EVEN THOUGH YOU MAY FEEL NORMAL, YOUR REACTIONS MAY BE AFFECTED BY THE MEDICATION YOU HAVE RECEIVED.    DO NOT DRINK ALCOHOLIC BEVERAGES FOR 24 HOURS FOLLOWING YOUR SURGERY.    DRINK CLEAR LIQUIDS (APPLE JUICE, GINGER ALE, 7-UP, BROTH, ETC.).  PROGRESS TO YOUR REGULAR DIET AS YOU FEEL ABLE.    YOU MAY HAVE A DRY MOUTH, A SORE THROAT, MUSCLES ACHES OR TROUBLE SLEEPING.  THESE SHOULD GO AWAY AFTER 24 HOURS.    CALL YOUR DOCTOR FOR ANY OF THE FOLLOWING:  SIGNS OF INFECTION (FEVER, GROWING TENDERNESS AT THE SURGERY  SITE, A LARGE AMOUNT OF DRAINAGE OR BLEEDING, SEVERE PAIN, FOUL-SMELLING DRAINAGE, REDNESS OR SWELLING.    IT HAS BEEN OVER 8 TO 10 HOURS SINCE SURGERY AND YOU ARE STILL NOT ABLE TO URINATE (PASS WATER).      Maximum acetaminophen (Tylenol) dose from all sources should not exceed 4 grams (4000 mg) per day. You last had 975mg at 6:45am, your next dose is 12:45pm or later.

## 2023-05-10 ENCOUNTER — TELEPHONE (OUTPATIENT)
Dept: SURGERY | Facility: CLINIC | Age: 48
End: 2023-05-10
Payer: COMMERCIAL

## 2023-05-10 PROCEDURE — 99024 POSTOP FOLLOW-UP VISIT: CPT | Performed by: PHYSICIAN ASSISTANT

## 2023-05-10 NOTE — TELEPHONE ENCOUNTER
[facility] Postoperative Call Note:     Allen Hill was called for an update regarding his recovery. He underwent a right inguinal  hernia repair with mesh by Dr. Mauricio on 4/25/23. Today he tells me he is doing well and denies any complaints. He is eating a normal diet and his bowels are regular. He states his wounds are healing well.    Lifting restrictions reviewed. Patient was instructed to slowly and gradually resume all normal activities.The patient states all of his questions were answered. He understands our discussion. He agrees to follow up as needed or to call our office with any concerns.    Wyatt Mcfadden PA-C      Please route or send letter to:  Primary Care Provider (PCP)

## 2023-07-14 ENCOUNTER — TRANSFERRED RECORDS (OUTPATIENT)
Dept: HEALTH INFORMATION MANAGEMENT | Facility: CLINIC | Age: 48
End: 2023-07-14
Payer: COMMERCIAL

## 2023-08-09 ENCOUNTER — TRANSFERRED RECORDS (OUTPATIENT)
Dept: HEALTH INFORMATION MANAGEMENT | Facility: CLINIC | Age: 48
End: 2023-08-09

## 2024-02-06 ENCOUNTER — PATIENT OUTREACH (OUTPATIENT)
Dept: CARE COORDINATION | Facility: CLINIC | Age: 49
End: 2024-02-06
Payer: COMMERCIAL

## 2024-02-20 ENCOUNTER — PATIENT OUTREACH (OUTPATIENT)
Dept: CARE COORDINATION | Facility: CLINIC | Age: 49
End: 2024-02-20
Payer: COMMERCIAL

## 2024-04-20 ENCOUNTER — HEALTH MAINTENANCE LETTER (OUTPATIENT)
Age: 49
End: 2024-04-20

## 2024-09-03 ENCOUNTER — PATIENT OUTREACH (OUTPATIENT)
Dept: CARE COORDINATION | Facility: CLINIC | Age: 49
End: 2024-09-03
Payer: COMMERCIAL

## 2025-05-10 ENCOUNTER — HEALTH MAINTENANCE LETTER (OUTPATIENT)
Age: 50
End: 2025-05-10

## (undated) DEVICE — ESU PENCIL W/HOLSTER E2350H

## (undated) DEVICE — DAVINCI XI SEAL UNIVERSAL 5-8MM 470361

## (undated) DEVICE — Device

## (undated) DEVICE — ENDO POUCH UNIVERSAL RETRIEVAL SYSTEM INZII 5MM CD003

## (undated) DEVICE — BLADE KNIFE SURG 11 371111

## (undated) DEVICE — DAVINCI HOT SHEARS TIP COVER  400180

## (undated) DEVICE — DAVINCI XI DRAPE ARM 470015

## (undated) DEVICE — LINEN ORTHO ACL PACK 5447

## (undated) DEVICE — GLOVE BIOGEL PI MICRO INDICATOR UNDERGLOVE SZ 8.0 48980

## (undated) DEVICE — SU VICRYL 0 UR-6 27" J603H

## (undated) DEVICE — SU VICRYL 4-0 PS-2 18" UND J496H

## (undated) DEVICE — DAVINCI XI OBTURATOR BLADELESS 8MM 470359

## (undated) DEVICE — ESU ELEC BLADE 2.75" COATED/INSULATED E1455

## (undated) DEVICE — DRAPE MAYO STAND 23X54 8337

## (undated) DEVICE — GLOVE BIOGEL PI ULTRATOUCH SZ 7.5 41175

## (undated) DEVICE — SOL WATER IRRIG 1000ML BOTTLE 2F7114

## (undated) DEVICE — ENDO TROCAR FIRST ENTRY KII FIOS Z-THRD 05X100MM CTF03

## (undated) DEVICE — ESU GROUND PAD ADULT W/CORD E7507

## (undated) DEVICE — GOWN IMPERVIOUS SPECIALTY XLG/XLONG 32474

## (undated) DEVICE — SU WND CLOSURE VLOC 180 ABS 3-0 6" V-20 VLOCL0604

## (undated) DEVICE — LUBRICANT INST ELECTROLUBE EL101

## (undated) RX ORDER — FENTANYL CITRATE 50 UG/ML
INJECTION, SOLUTION INTRAMUSCULAR; INTRAVENOUS
Status: DISPENSED
Start: 2023-04-25

## (undated) RX ORDER — DEXAMETHASONE SODIUM PHOSPHATE 4 MG/ML
INJECTION, SOLUTION INTRA-ARTICULAR; INTRALESIONAL; INTRAMUSCULAR; INTRAVENOUS; SOFT TISSUE
Status: DISPENSED
Start: 2023-04-25

## (undated) RX ORDER — LIDOCAINE HYDROCHLORIDE 10 MG/ML
INJECTION, SOLUTION EPIDURAL; INFILTRATION; INTRACAUDAL; PERINEURAL
Status: DISPENSED
Start: 2023-04-25

## (undated) RX ORDER — PROPOFOL 10 MG/ML
INJECTION, EMULSION INTRAVENOUS
Status: DISPENSED
Start: 2023-04-25

## (undated) RX ORDER — ACETAMINOPHEN 325 MG/1
TABLET ORAL
Status: DISPENSED
Start: 2023-04-25

## (undated) RX ORDER — CLINDAMYCIN PHOSPHATE 900 MG/50ML
INJECTION, SOLUTION INTRAVENOUS
Status: DISPENSED
Start: 2023-04-25

## (undated) RX ORDER — GLYCOPYRROLATE 0.2 MG/ML
INJECTION INTRAMUSCULAR; INTRAVENOUS
Status: DISPENSED
Start: 2023-04-25

## (undated) RX ORDER — BUPIVACAINE HYDROCHLORIDE AND EPINEPHRINE 5; 5 MG/ML; UG/ML
INJECTION, SOLUTION EPIDURAL; INTRACAUDAL; PERINEURAL
Status: DISPENSED
Start: 2023-04-25